# Patient Record
Sex: MALE | Race: WHITE | Employment: FULL TIME | URBAN - METROPOLITAN AREA
[De-identification: names, ages, dates, MRNs, and addresses within clinical notes are randomized per-mention and may not be internally consistent; named-entity substitution may affect disease eponyms.]

---

## 2020-09-12 RX ORDER — OMEPRAZOLE 20 MG/1
CAPSULE, DELAYED RELEASE ORAL
Qty: 90 CAP | Refills: 1 | Status: SHIPPED | OUTPATIENT
Start: 2020-09-12 | End: 2021-03-15 | Stop reason: SDUPTHER

## 2020-09-30 ENCOUNTER — OFFICE VISIT (OUTPATIENT)
Dept: PRIMARY CARE CLINIC | Age: 45
End: 2020-09-30
Payer: COMMERCIAL

## 2020-09-30 ENCOUNTER — HOSPITAL ENCOUNTER (OUTPATIENT)
Dept: GENERAL RADIOLOGY | Age: 45
Discharge: HOME | End: 2020-09-30
Payer: COMMERCIAL

## 2020-09-30 VITALS
BODY MASS INDEX: 34.21 KG/M2 | RESPIRATION RATE: 18 BRPM | HEART RATE: 75 BPM | OXYGEN SATURATION: 97 % | TEMPERATURE: 97.7 F | HEIGHT: 67 IN | SYSTOLIC BLOOD PRESSURE: 126 MMHG | WEIGHT: 218 LBS | DIASTOLIC BLOOD PRESSURE: 73 MMHG

## 2020-09-30 DIAGNOSIS — K59.00 CONSTIPATION, UNSPECIFIED CONSTIPATION TYPE: Primary | ICD-10-CM

## 2020-09-30 DIAGNOSIS — R33.9 INCOMPLETE BLADDER EMPTYING: ICD-10-CM

## 2020-09-30 DIAGNOSIS — K59.00 CONSTIPATION, UNSPECIFIED CONSTIPATION TYPE: ICD-10-CM

## 2020-09-30 PROCEDURE — 99214 OFFICE O/P EST MOD 30 MIN: CPT | Performed by: NURSE PRACTITIONER

## 2020-09-30 PROCEDURE — 74018 RADEX ABDOMEN 1 VIEW: CPT

## 2020-09-30 PROCEDURE — 81003 URINALYSIS AUTO W/O SCOPE: CPT | Performed by: NURSE PRACTITIONER

## 2020-09-30 RX ORDER — POLYETHYLENE GLYCOL 3350 17 G/17G
17 POWDER, FOR SOLUTION ORAL DAILY
Qty: 30 EACH | Refills: 1 | Status: SHIPPED | OUTPATIENT
Start: 2020-09-30 | End: 2021-04-20 | Stop reason: ALTCHOICE

## 2020-10-02 LAB
BILIRUB UR QL STRIP: NEGATIVE
GLUCOSE UR-MCNC: NEGATIVE MG/DL
KETONES P FAST UR STRIP-MCNC: NEGATIVE MG/DL
PH UR STRIP: 5.5 [PH] (ref 4.6–8)
PROT UR QL STRIP: NEGATIVE
SP GR UR STRIP: 1 (ref 1–1.03)
UA UROBILINOGEN AMB POC: NORMAL (ref 0.2–1)
URINALYSIS CLARITY POC: CLEAR
URINALYSIS COLOR POC: YELLOW
URINE BLOOD POC: NEGATIVE
URINE LEUKOCYTES POC: NEGATIVE
URINE NITRITES POC: NEGATIVE

## 2020-10-03 LAB
PSA SERPL-MCNC: 1.1 NG/ML (ref 0–4)
REFLEX CRITERIA: NORMAL

## 2020-10-04 NOTE — PROGRESS NOTES
Spoke with patient in office and discussed small to moderate stool on x-ray. Also showed degnerative changes in lumbar as incidental finding. He reports improvement since treatment for acute constipation and he will follow up in 2 weeks to re-evaluate if miralax effective.

## 2020-10-04 NOTE — PROGRESS NOTES
Urine test and PSA are normal. Will refer to urologist for further eval of urinary concerns. Any questions please let me know.

## 2020-10-05 ENCOUNTER — TELEPHONE (OUTPATIENT)
Dept: PRIMARY CARE CLINIC | Age: 45
End: 2020-10-05

## 2020-10-05 NOTE — TELEPHONE ENCOUNTER
Pt called back and was given the results. He is declining to see a urologist for now       ----- Message from Cortney Jama LPN sent at [id number]/6721  9:03 AM EDT -----  Called pt no answer had to LVM to call the office      ----- Message -----  From: Philip Alston NP  Sent: 10/4/2020   5:51 PM EDT  To: Cortney Jama LPN    Urine test and PSA are normal. Will refer to urologist for further eval of urinary concerns. Any questions please let me know.

## 2020-10-14 ENCOUNTER — OFFICE VISIT (OUTPATIENT)
Dept: PRIMARY CARE CLINIC | Age: 45
End: 2020-10-14
Payer: COMMERCIAL

## 2020-10-14 VITALS
OXYGEN SATURATION: 98 % | RESPIRATION RATE: 18 BRPM | SYSTOLIC BLOOD PRESSURE: 109 MMHG | DIASTOLIC BLOOD PRESSURE: 70 MMHG | HEART RATE: 91 BPM | TEMPERATURE: 96.9 F

## 2020-10-14 DIAGNOSIS — R19.5 CHANGE IN STOOL CALIBER: ICD-10-CM

## 2020-10-14 DIAGNOSIS — R33.9 INCOMPLETE BLADDER EMPTYING: ICD-10-CM

## 2020-10-14 DIAGNOSIS — K59.00 CONSTIPATION, UNSPECIFIED CONSTIPATION TYPE: Primary | ICD-10-CM

## 2020-10-14 PROCEDURE — 99213 OFFICE O/P EST LOW 20 MIN: CPT | Performed by: NURSE PRACTITIONER

## 2020-10-16 NOTE — PROGRESS NOTES
Mario Ragland is a 39 y.o. male who presents to the office today for the following:    Chief Complaint   Patient presents with    Constipation       Past Medical History:   Diagnosis Date    Anxiety     Depression     GERD (gastroesophageal reflux disease)     Psychotic disorder ([facility] [address]        Past Surgical History:   Procedure Laterality Date    HX APPENDECTOMY          Family History   Problem Relation Age of Onset    No Known Problems Mother     No Known Problems Father         Social History     Tobacco Use    Smoking status: Current Every Day Smoker    Smokeless tobacco: Never Used   Substance Use Topics    Alcohol use: Not on file    Drug use: Not on file        HPI  Patient here for follow up from visit on 9/30/20 with complaint of constipation, stomach cramping and change in stool which has been worsening for about 6 months. States that he did initially feel better after clearing out with the mag citrate and starting the miralax but now feels symptoms are back. Is experiencing intermittent stomach cramping as well as he still feels stool is an odd shape. Reports that his stools almost look flattend on one side. No blood however noted. Does feel they are now softer w/ no straining as well as more regular since starting the miralax. Appetite and activity level ok. Current Outpatient Medications on File Prior to Visit   Medication Sig    polyethylene glycol (MIRALAX) 17 gram packet Take 1 Packet by mouth daily.  omeprazole (PRILOSEC) 20 mg capsule TAKE 1 CAPSULE BY MOUTH EVERY DAY     No current facility-administered medications on file prior to visit. No orders of the defined types were placed in this encounter. Review of Systems   Constitutional: Negative. Respiratory: Negative. Cardiovascular: Negative. Gastrointestinal: Positive for abdominal pain and constipation. Negative for diarrhea, heartburn, melena, nausea and vomiting.    Genitourinary: Positive for frequency. Negative for dysuria, hematuria and urgency. Musculoskeletal: Negative. Neurological: Negative. Visit Vitals  /70 (BP 1 Location: Left arm, BP Patient Position: Sitting)   Pulse 91   Temp 96.9 °F (36.1 °C) (Tympanic)   Resp 18   SpO2 98%       Physical Exam  Vitals signs and nursing note reviewed. Constitutional:       Appearance: Normal appearance. Cardiovascular:      Rate and Rhythm: Normal rate and regular rhythm. Pulses: Normal pulses. Heart sounds: Normal heart sounds. Pulmonary:      Effort: Pulmonary effort is normal.      Breath sounds: Normal breath sounds. Abdominal:      General: Bowel sounds are normal. There is no distension. Palpations: Abdomen is soft. There is no mass. Tenderness: There is no abdominal tenderness. There is no guarding or rebound. Hernia: No hernia is present. Musculoskeletal: Normal range of motion. Skin:     General: Skin is warm and dry. Neurological:      Mental Status: He is alert. Mental status is at baseline. 1. Constipation, unspecified constipation type  He will continue miralax as directed since he feels this has helped with his constipation  Is still experiencing intermittent cramping and feels stool is changed in shape  No significant abdominal pain and does report history of problems with constipation most of adult life but really worsened in past 6 months  Seems potential that he has IBS as he does not have the cramping every day and stools seem regular/soft now that he started the miralax  Given however he is reporting change in shape of stool and sx of persistent intermittent cramping, will refer to GI for further eval and treatment  - REFERRAL TO GASTROENTEROLOGY    2. Change in stool caliber  See above    3.  Incomplete bladder emptying  PSA was 1.1 and UA clear  We discussed referral to urology but sx are intermittent and feels it could be more associated with acute constipation episodes.       Patient verbalizes understanding of plan of care as discussed above

## 2020-11-11 ENCOUNTER — OFFICE VISIT (OUTPATIENT)
Dept: GASTROENTEROLOGY | Age: 45
End: 2020-11-11
Payer: COMMERCIAL

## 2020-11-11 ENCOUNTER — TELEPHONE (OUTPATIENT)
Dept: GASTROENTEROLOGY | Age: 45
End: 2020-11-11

## 2020-11-11 VITALS
HEART RATE: 67 BPM | HEIGHT: 67 IN | OXYGEN SATURATION: 96 % | DIASTOLIC BLOOD PRESSURE: 80 MMHG | TEMPERATURE: 97.6 F | SYSTOLIC BLOOD PRESSURE: 120 MMHG | WEIGHT: 213 LBS | RESPIRATION RATE: 16 BRPM | BODY MASS INDEX: 33.43 KG/M2

## 2020-11-11 DIAGNOSIS — R19.4 CHANGE IN BOWEL HABITS: Primary | ICD-10-CM

## 2020-11-11 DIAGNOSIS — K59.00 CONSTIPATION, UNSPECIFIED CONSTIPATION TYPE: ICD-10-CM

## 2020-11-11 DIAGNOSIS — R10.30 LOWER ABDOMINAL PAIN: ICD-10-CM

## 2020-11-11 PROBLEM — F41.9 ANXIETY: Status: ACTIVE | Noted: 2020-11-11

## 2020-11-11 PROBLEM — K21.9 GERD (GASTROESOPHAGEAL REFLUX DISEASE): Status: ACTIVE | Noted: 2020-11-11

## 2020-11-11 PROBLEM — F32.A DEPRESSION: Status: ACTIVE | Noted: 2020-11-11

## 2020-11-11 PROCEDURE — 99204 OFFICE O/P NEW MOD 45 MIN: CPT | Performed by: INTERNAL MEDICINE

## 2020-11-11 RX ORDER — POLYETHYLENE GLYCOL 3350 17 G/17G
POWDER, FOR SOLUTION ORAL
Qty: 510 G | Refills: 0 | Status: SHIPPED | OUTPATIENT
Start: 2020-11-11 | End: 2020-11-20

## 2020-11-11 NOTE — PROGRESS NOTES
1. Have you been to the ER, urgent care clinic since your last visit? Hospitalized since your last visit? no    2. Have you seen or consulted any other health care providers outside of the [address] [place] [address] since your last visit? Include any pap smears or colon screening.  no.

## 2020-11-11 NOTE — H&P (VIEW-ONLY)
Levy Stanford is a 39 y.o. male who presents today for the following: Chief Complaint Patient presents with  Constipation  
  referred by Dena Rivers NP No Known Allergies Current Outpatient Medications Medication Sig  polyethylene glycol (MIRALAX) 17 gram/dose powder Use as directed  Indications: emptying of the bowel  polyethylene glycol (MIRALAX) 17 gram packet Take 1 Packet by mouth daily.  omeprazole (PRILOSEC) 20 mg capsule TAKE 1 CAPSULE BY MOUTH EVERY DAY No current facility-administered medications for this visit. Past Medical History:  
Diagnosis Date  Anxiety  Anxiety 11/11/2020  Depression  Depression 11/11/2020  GERD (gastroesophageal reflux disease)  GERD (gastroesophageal reflux disease) 11/11/2020  Psychotic disorder ([facility] [address] Past Surgical History:  
Procedure Laterality Date  HX APPENDECTOMY Family History Problem Relation Age of Onset  No Known Problems Mother  No Known Problems Father Social History Socioeconomic History  Marital status:  Spouse name: Not on file  Number of children: Not on file  Years of education: Not on file  Highest education level: Not on file Occupational History  Not on file Social Needs  Financial resource strain: Not on file  Food insecurity Worry: Not on file Inability: Not on file  Transportation needs Medical: Not on file Non-medical: Not on file Tobacco Use  Smoking status: Current Every Day Smoker  Smokeless tobacco: Never Used Substance and Sexual Activity  Alcohol use: Not on file  Drug use: Not on file  Sexual activity: Not on file Lifestyle  Physical activity Days per week: Not on file Minutes per session: Not on file  Stress: Not on file Relationships  Social connections Talks on phone: Not on file Gets together: Not on file   Attends Spiritism service: Not on file Active member of club or organization: Not on file Attends meetings of clubs or organizations: Not on file Relationship status: Not on file  Intimate partner violence Fear of current or ex partner: Not on file Emotionally abused: Not on file Physically abused: Not on file Forced sexual activity: Not on file Other Topics Concern  Not on file Social History Narrative  Not on file HPI 
61-year-old male with history of GERD was referred for evaluation of a change in bowel habits. States a couple months ago he was seen for evaluation by his PCP with constipation and crampy abdominal pain been given worse over the prior 6 months. Was given a dose of magnesium citrate and then started on MiraLAX patient states the constipation improved however he continues to have crampy abdominal pain and the shape of his stools have changed. He states his stool come out smaller and thin now which is new. Patient states the stools appear flattened on one side. States after a bowel movement he may pass a small amount of stool he has a lot of crampy pain. Sensation of having to defecate and when he attempts a bowel movement passed no stool. He states his stools are solid and soft. Some blood in his stool but has seen blood on wiping in the past.  He has a good appetite and eating well. States he has history of indigestion and was treated with help. Patient states he is recently increased in weight after starting treatment for the GERD. Patient states his grandmother had colon cancer. Review of Systems Constitutional: Negative. HENT: Negative. Negative for nosebleeds. Eyes: Negative. Respiratory: Negative. Cardiovascular: Negative. Gastrointestinal: Positive for abdominal pain, blood in stool, constipation and heartburn. Negative for diarrhea, melena, nausea and vomiting. Genitourinary: Negative. Musculoskeletal: Negative. Skin: Negative. Neurological: Negative. Endo/Heme/Allergies: Negative. Psychiatric/Behavioral: Negative. All other systems reviewed and are negative. Visit Vitals /80 (BP 1 Location: Left arm, BP Patient Position: Sitting) Pulse 67 Temp 97.6 °F (36.4 °C) (Skin) Resp 16 Ht 5' 7\" (1.702 m) Wt 96.6 kg (213 lb) SpO2 96% Comment: room air BMI 33.36 kg/m² Physical Exam 
Vitals signs and nursing note reviewed. Constitutional:   
   Appearance: Normal appearance. He is obese. HENT:  
   Head: Normocephalic and atraumatic. Nose: Nose normal.  
   Mouth/Throat:  
   Mouth: Mucous membranes are moist.  
   Pharynx: Oropharynx is clear. Eyes:  
   General: No scleral icterus. Extraocular Movements: Extraocular movements intact. Conjunctiva/sclera: Conjunctivae normal.  
   Pupils: Pupils are equal, round, and reactive to light. Neck: Musculoskeletal: Normal range of motion and neck supple. Cardiovascular:  
   Rate and Rhythm: Normal rate and regular rhythm. Heart sounds: Normal heart sounds. Pulmonary:  
   Effort: Pulmonary effort is normal.  
   Breath sounds: Normal breath sounds. Abdominal:  
   General: Bowel sounds are normal. There is no distension. Palpations: Abdomen is soft. There is no mass. Tenderness: There is abdominal tenderness. There is guarding. There is no right CVA tenderness, left CVA tenderness or rebound. Hernia: No hernia is present. Musculoskeletal: Normal range of motion. Skin: 
   General: Skin is warm and dry. Coloration: Skin is not jaundiced. Neurological:  
   General: No focal deficit present. Mental Status: He is alert and oriented to person, place, and time. Psychiatric:     
   Mood and Affect: Mood normal.     
   Behavior: Behavior normal.     
   Thought Content: Thought content normal.     
   Judgment: Judgment normal.  
 
  
 
 
1. Change in bowel habits Uncertain of cause.   Patient's shape of his stool suggests narrowing in his colon at some point which is change in the shape of the stool. This could be secondary to diverticular disease with swollen and decreased intraluminal size versus some other lesions causing the same. 
- COLONOSCOPY,DIAGNOSTIC; Future - polyethylene glycol (MIRALAX) 17 gram/dose powder; Use as directed  Indications: emptying of the bowel  Dispense: 510 g; Refill: 0 
 
2. Constipation, unspecified constipation type Improve with the l use of the MiraLAX 3. Lower abdominal pain

## 2020-11-11 NOTE — PROGRESS NOTES
COLONOSCOPY IS SCHEDULED FOR 11- AT 8:30 AM.  NO PA REQUIRED FOR COLONOSCOPY PER [facility]-REF # [id number] 11- AT 5:43 PM

## 2020-11-11 NOTE — PROGRESS NOTES
Fernando Castro is a 39 y.o. male who presents today for the following:  Chief Complaint   Patient presents with    Constipation     referred by Tim Vega NP         No Known Allergies    Current Outpatient Medications   Medication Sig    polyethylene glycol (MIRALAX) 17 gram/dose powder Use as directed  Indications: emptying of the bowel    polyethylene glycol (MIRALAX) 17 gram packet Take 1 Packet by mouth daily.  omeprazole (PRILOSEC) 20 mg capsule TAKE 1 CAPSULE BY MOUTH EVERY DAY     No current facility-administered medications for this visit.         Past Medical History:   Diagnosis Date    Anxiety     Anxiety 11/11/2020    Depression     Depression 11/11/2020    GERD (gastroesophageal reflux disease)     GERD (gastroesophageal reflux disease) 11/11/2020    Psychotic disorder ([facility] [address]        Past Surgical History:   Procedure Laterality Date    HX APPENDECTOMY         Family History   Problem Relation Age of Onset    No Known Problems Mother     No Known Problems Father        Social History     Socioeconomic History    Marital status:      Spouse name: Not on file    Number of children: Not on file    Years of education: Not on file    Highest education level: Not on file   Occupational History    Not on file   Social Needs    Financial resource strain: Not on file    Food insecurity     Worry: Not on file     Inability: Not on file    Transportation needs     Medical: Not on file     Non-medical: Not on file   Tobacco Use    Smoking status: Current Every Day Smoker    Smokeless tobacco: Never Used   Substance and Sexual Activity    Alcohol use: Not on file    Drug use: Not on file    Sexual activity: Not on file   Lifestyle    Physical activity     Days per week: Not on file     Minutes per session: Not on file    Stress: Not on file   Relationships    Social connections     Talks on phone: Not on file     Gets together: Not on file     Attends Adventism service: Not on file     Active member of club or organization: Not on file     Attends meetings of clubs or organizations: Not on file     Relationship status: Not on file    Intimate partner violence     Fear of current or ex partner: Not on file     Emotionally abused: Not on file     Physically abused: Not on file     Forced sexual activity: Not on file   Other Topics Concern    Not on file   Social History Narrative    Not on file         HPI  44-year-old male with history of GERD was referred for evaluation of a change in bowel habits. States a couple months ago he was seen for evaluation by his PCP with constipation and crampy abdominal pain been given worse over the prior 6 months. Was given a dose of magnesium citrate and then started on MiraLAX patient states the constipation improved however he continues to have crampy abdominal pain and the shape of his stools have changed. He states his stool come out smaller and thin now which is new. Patient states the stools appear flattened on one side. States after a bowel movement he may pass a small amount of stool he has a lot of crampy pain. Sensation of having to defecate and when he attempts a bowel movement passed no stool. He states his stools are solid and soft. Some blood in his stool but has seen blood on wiping in the past.  He has a good appetite and eating well. States he has history of indigestion and was treated with help. Patient states he is recently increased in weight after starting treatment for the GERD. Patient states his grandmother had colon cancer. Review of Systems   Constitutional: Negative. HENT: Negative. Negative for nosebleeds. Eyes: Negative. Respiratory: Negative. Cardiovascular: Negative. Gastrointestinal: Positive for abdominal pain, blood in stool, constipation and heartburn. Negative for diarrhea, melena, nausea and vomiting. Genitourinary: Negative. Musculoskeletal: Negative. Skin: Negative. Neurological: Negative. Endo/Heme/Allergies: Negative. Psychiatric/Behavioral: Negative. All other systems reviewed and are negative. Visit Vitals  /80 (BP 1 Location: Left arm, BP Patient Position: Sitting)   Pulse 67   Temp 97.6 °F (36.4 °C) (Skin)   Resp 16   Ht 5' 7\" (1.702 m)   Wt 96.6 kg (213 lb)   SpO2 96% Comment: room air   BMI 33.36 kg/m²     Physical Exam  Vitals signs and nursing note reviewed. Constitutional:       Appearance: Normal appearance. He is obese. HENT:      Head: Normocephalic and atraumatic. Nose: Nose normal.      Mouth/Throat:      Mouth: Mucous membranes are moist.      Pharynx: Oropharynx is clear. Eyes:      General: No scleral icterus. Extraocular Movements: Extraocular movements intact. Conjunctiva/sclera: Conjunctivae normal.      Pupils: Pupils are equal, round, and reactive to light. Neck:      Musculoskeletal: Normal range of motion and neck supple. Cardiovascular:      Rate and Rhythm: Normal rate and regular rhythm. Heart sounds: Normal heart sounds. Pulmonary:      Effort: Pulmonary effort is normal.      Breath sounds: Normal breath sounds. Abdominal:      General: Bowel sounds are normal. There is no distension. Palpations: Abdomen is soft. There is no mass. Tenderness: There is abdominal tenderness. There is guarding. There is no right CVA tenderness, left CVA tenderness or rebound. Hernia: No hernia is present. Musculoskeletal: Normal range of motion. Skin:     General: Skin is warm and dry. Coloration: Skin is not jaundiced. Neurological:      General: No focal deficit present. Mental Status: He is alert and oriented to person, place, and time. Psychiatric:         Mood and Affect: Mood normal.         Behavior: Behavior normal.         Thought Content: Thought content normal.         Judgment: Judgment normal.            1. Change in bowel habits  Uncertain of cause.   Patient's shape of his stool suggests narrowing in his colon at some point which is change in the shape of the stool. This could be secondary to diverticular disease with swollen and decreased intraluminal size versus some other lesions causing the same.  - COLONOSCOPY,DIAGNOSTIC; Future  - polyethylene glycol (MIRALAX) 17 gram/dose powder; Use as directed  Indications: emptying of the bowel  Dispense: 510 g; Refill: 0    2. Constipation, unspecified constipation type  Improve with the l use of the MiraLAX    3.  Lower abdominal pain

## 2020-11-13 ENCOUNTER — HOSPITAL ENCOUNTER (OUTPATIENT)
Dept: PREADMISSION TESTING | Age: 45
Discharge: HOME | End: 2020-11-13
Payer: COMMERCIAL

## 2020-11-13 LAB — SARS-COV-2, COV2: NORMAL

## 2020-11-13 PROCEDURE — 87635 SARS-COV-2 COVID-19 AMP PRB: CPT

## 2020-11-16 LAB — SARS-COV-2, COV2NT: NOT DETECTED

## 2020-11-20 ENCOUNTER — HOSPITAL ENCOUNTER (OUTPATIENT)
Age: 45
Setting detail: OUTPATIENT SURGERY
Discharge: HOME | End: 2020-11-20
Attending: INTERNAL MEDICINE | Admitting: INTERNAL MEDICINE
Payer: COMMERCIAL

## 2020-11-20 VITALS
WEIGHT: 213 LBS | HEART RATE: 77 BPM | HEIGHT: 71 IN | DIASTOLIC BLOOD PRESSURE: 85 MMHG | BODY MASS INDEX: 29.82 KG/M2 | SYSTOLIC BLOOD PRESSURE: 110 MMHG | OXYGEN SATURATION: 95 % | TEMPERATURE: 97.9 F | RESPIRATION RATE: 18 BRPM

## 2020-11-20 PROCEDURE — 88305 TISSUE EXAM BY PATHOLOGIST: CPT

## 2020-11-20 PROCEDURE — 76060000032 HC ANESTHESIA 0.5 TO 1 HR: Performed by: INTERNAL MEDICINE

## 2020-11-20 PROCEDURE — 77030019988 HC FCPS ENDOSC DISP BSC -B: Performed by: INTERNAL MEDICINE

## 2020-11-20 PROCEDURE — 76040000007: Performed by: INTERNAL MEDICINE

## 2020-11-20 PROCEDURE — 74011250636 HC RX REV CODE- 250/636: Performed by: INTERNAL MEDICINE

## 2020-11-20 PROCEDURE — 45385 COLONOSCOPY W/LESION REMOVAL: CPT | Performed by: INTERNAL MEDICINE

## 2020-11-20 PROCEDURE — 2709999900 HC NON-CHARGEABLE SUPPLY: Performed by: INTERNAL MEDICINE

## 2020-11-20 RX ORDER — MIDAZOLAM HYDROCHLORIDE 1 MG/ML
INJECTION, SOLUTION INTRAMUSCULAR; INTRAVENOUS
Status: DISCONTINUED
Start: 2020-11-20 | End: 2020-11-20 | Stop reason: WASHOUT

## 2020-11-20 RX ORDER — FENTANYL CITRATE 50 UG/ML
INJECTION, SOLUTION INTRAMUSCULAR; INTRAVENOUS AS NEEDED
Status: DISCONTINUED | OUTPATIENT
Start: 2020-11-20 | End: 2020-11-20 | Stop reason: HOSPADM

## 2020-11-20 RX ORDER — MIDAZOLAM HYDROCHLORIDE 1 MG/ML
INJECTION, SOLUTION INTRAMUSCULAR; INTRAVENOUS AS NEEDED
Status: DISCONTINUED | OUTPATIENT
Start: 2020-11-20 | End: 2020-11-20 | Stop reason: HOSPADM

## 2020-11-20 RX ORDER — SODIUM CHLORIDE 0.9 % (FLUSH) 0.9 %
5-40 SYRINGE (ML) INJECTION AS NEEDED
Status: DISCONTINUED | OUTPATIENT
Start: 2020-11-20 | End: 2020-11-20 | Stop reason: HOSPADM

## 2020-11-20 RX ORDER — SODIUM CHLORIDE 9 MG/ML
125 INJECTION, SOLUTION INTRAVENOUS CONTINUOUS
Status: DISCONTINUED | OUTPATIENT
Start: 2020-11-20 | End: 2020-11-20 | Stop reason: HOSPADM

## 2020-11-20 RX ORDER — SODIUM CHLORIDE 0.9 % (FLUSH) 0.9 %
5-40 SYRINGE (ML) INJECTION EVERY 8 HOURS
Status: DISCONTINUED | OUTPATIENT
Start: 2020-11-20 | End: 2020-11-20 | Stop reason: HOSPADM

## 2020-11-20 RX ORDER — FENTANYL CITRATE 50 UG/ML
INJECTION, SOLUTION INTRAMUSCULAR; INTRAVENOUS
Status: DISCONTINUED
Start: 2020-11-20 | End: 2020-11-20 | Stop reason: WASHOUT

## 2020-11-20 RX ORDER — FENTANYL CITRATE 50 UG/ML
INJECTION, SOLUTION INTRAMUSCULAR; INTRAVENOUS
Status: DISCONTINUED
Start: 2020-11-20 | End: 2020-11-20 | Stop reason: HOSPADM

## 2020-11-20 RX ORDER — MIDAZOLAM HYDROCHLORIDE 1 MG/ML
INJECTION, SOLUTION INTRAMUSCULAR; INTRAVENOUS
Status: DISCONTINUED
Start: 2020-11-20 | End: 2020-11-20 | Stop reason: HOSPADM

## 2020-11-20 RX ADMIN — SODIUM CHLORIDE 125 ML/HR: 9 INJECTION, SOLUTION INTRAVENOUS at 07:55

## 2020-11-20 NOTE — DISCHARGE INSTRUCTIONS

## 2020-11-20 NOTE — OP NOTES
Colonoscopy Procedure Note      Patient: Filiberto Duran MRN: [id number]  SSN: [id number]    YOB: 1975  Age: 39 y.o. Sex: male      Date of Procedure: 11/20/2020  Date/Time:  11/20/2020 9:51 AM       IMPRESSION:     1. Rectal polyps         RECOMMENDATIONS:     1) Check biopsy results. 2) Await pathology report. Call me in 2 weeks if you have not received any information from my office regarding your results. 3) Patient should have repeat colonoscopy in 2 to 3 years or as determined by the pathology report. INDICATION: Change in bowel habits   PROCEDURE PERFORMED: Colonoscopy with snare polypectomy   DESCRIPTION OF PROCEDURE: An informed consent was obtained. The patient was placed in left lateral position. Perianal inspection and a digital rectal exam was performed. Video colonoscope was introduced into the rectum and advanced under direct vision up to the terminal ileum. With adequate insufflation and maneuvering of the withdrawing scope, the colonic mucosa was visualized carefully. Retroflexion was performed in the rectum to see the anorectum and also in the ascending colon to look behind the folds. Vital signs, pulse oximetry, single lead cardiac monitor were monitored throughout the procedure as the sedation was titrated to the desired effect ensuring patient comfort and safety. The patient tolerated the procedure very well and was transferred to the recovery area. Following is the summary of findings: In the proximal rectum is sore a polyp measuring proxy 0.5 cm was removed via cold biopsy. 1.5 cm polyp was noted in the mid rectum which was removed via snare polypectomy. The polyp was pedunculated and was completely removed.       ENDOSCOPIST: Don Rodarte MD   ENDOSCOPE: Olympus video colonoscope   ASSISTANT:Circ-1: David Wilson               Circ-2: Gregg Segura              Scrub Tech-1: Any Luis Staff: Jerry Hernandez RN   ANESTHESIA: Moderate sedation with fentanyl 100 mcg IV and Versed 4 mg IV      QUALITY OF PREPARATION:fair      FINDINGS:   1.  Rectal polyps        EBL: Minimal   SPECIMENS:   ID Type Source Tests Collected by Time Destination   1 : rectal polyp Preservative Rectum  Sagar Cristobal MD 11/20/2020 [id number] Pathology             Radha West MD  November 20, 2020  9:51 AM

## 2020-11-20 NOTE — INTERVAL H&P NOTE
Update History & Physical 
 
The Patient's History and Physical of November 20, 2020  
 was reviewed with the patient and I examined the patient. There was no change. The surgical site was confirmed by the patient and me. Plan:  The risk, benefits, expected outcome, and alternative to the recommended procedure have been discussed with the patient. Patient understands and wants to proceed with the procedure.  
 
Electronically signed by Lexi Gannon MD on 11/20/2020 at 8:54 AM

## 2020-11-30 NOTE — PROGRESS NOTES
Tell patient that the polyp removed from his rectum was benign. He should have a repeat colonoscopy in 2 years.

## 2020-12-03 ENCOUNTER — TELEPHONE (OUTPATIENT)
Dept: GASTROENTEROLOGY | Age: 45
End: 2020-12-03

## 2020-12-03 NOTE — TELEPHONE ENCOUNTER
----- Message from Elias Contreras MD sent at 11/30/2020 11:53 AM EST -----  Tell patient that the polyp removed from his rectum was benign. He should have a repeat colonoscopy in 2 years.

## 2020-12-03 NOTE — TELEPHONE ENCOUNTER
Rashaad Cantu returned my call, I explained during his colonoscopy Dr Arely Nguyen removed on rectal which was completely benign. Will follow up and repeat colonoscopy in 2 years. He said ok.

## 2020-12-03 NOTE — TELEPHONE ENCOUNTER
----- Message from Yesi Osborn MD sent at 11/30/2020 11:53 AM EST -----  Tell patient that the polyp removed from his rectum was benign. He should have a repeat colonoscopy in 2 years.

## 2021-03-15 ENCOUNTER — TELEPHONE (OUTPATIENT)
Dept: PRIMARY CARE CLINIC | Age: 46
End: 2021-03-15

## 2021-03-15 RX ORDER — OMEPRAZOLE 20 MG/1
20 CAPSULE, DELAYED RELEASE ORAL DAILY
Qty: 90 CAP | Refills: 1 | Status: SHIPPED | OUTPATIENT
Start: 2021-03-15 | End: 2021-09-07

## 2021-04-15 ENCOUNTER — APPOINTMENT (OUTPATIENT)
Dept: GENERAL RADIOLOGY | Age: 46
End: 2021-04-15
Attending: EMERGENCY MEDICINE
Payer: COMMERCIAL

## 2021-04-15 ENCOUNTER — HOSPITAL ENCOUNTER (EMERGENCY)
Age: 46
Discharge: HOME | End: 2021-04-15
Attending: EMERGENCY MEDICINE
Payer: COMMERCIAL

## 2021-04-15 VITALS
SYSTOLIC BLOOD PRESSURE: 130 MMHG | BODY MASS INDEX: 29.01 KG/M2 | HEIGHT: 71 IN | TEMPERATURE: 98.4 F | WEIGHT: 207.23 LBS | OXYGEN SATURATION: 96 % | RESPIRATION RATE: 18 BRPM | HEART RATE: 103 BPM | DIASTOLIC BLOOD PRESSURE: 87 MMHG

## 2021-04-15 DIAGNOSIS — S80.10XA CONTUSION OF LOWER LEG, UNSPECIFIED LATERALITY, INITIAL ENCOUNTER: Primary | ICD-10-CM

## 2021-04-15 PROCEDURE — 74011250636 HC RX REV CODE- 250/636: Performed by: EMERGENCY MEDICINE

## 2021-04-15 PROCEDURE — 73590 X-RAY EXAM OF LOWER LEG: CPT

## 2021-04-15 PROCEDURE — 99283 EMERGENCY DEPT VISIT LOW MDM: CPT

## 2021-04-15 PROCEDURE — 96372 THER/PROPH/DIAG INJ SC/IM: CPT

## 2021-04-15 RX ORDER — OMEPRAZOLE 20 MG/1
20 TABLET, DELAYED RELEASE ORAL DAILY
COMMUNITY
End: 2021-04-20 | Stop reason: SDUPTHER

## 2021-04-15 RX ORDER — KETOROLAC TROMETHAMINE 30 MG/ML
60 INJECTION, SOLUTION INTRAMUSCULAR; INTRAVENOUS
Status: COMPLETED | OUTPATIENT
Start: 2021-04-15 | End: 2021-04-15

## 2021-04-15 RX ORDER — CLONIDINE HYDROCHLORIDE 0.1 MG/1
0.2 TABLET ORAL
Status: DISCONTINUED | OUTPATIENT
Start: 2021-04-15 | End: 2021-04-15

## 2021-04-15 RX ADMIN — KETOROLAC TROMETHAMINE 60 MG: 30 INJECTION, SOLUTION INTRAMUSCULAR at 06:37

## 2021-04-15 NOTE — ED PROVIDER NOTES
EMERGENCY DEPARTMENT HISTORY AND PHYSICAL EXAM      Date: 4/15/2021  Patient Name: Joe Gambino    History of Presenting Illness     Chief Complaint   Patient presents with    Leg Pain       History Provided By: Patient    HPI: Joe Gambino, 42 y.o. male with a past medical history significant GERD presents to the ED with cc of left lower leg pain. Patient works at [employer] lifting heavy objects not sure he hit his leg. No fevers of chills or obvious trauma. No hx of DVTs. No SOB or Chest Pain. There are no other complaints, changes, or physical findings at this time. PCP: Lana Cardona NP    No current facility-administered medications on file prior to encounter. Current Outpatient Medications on File Prior to Encounter   Medication Sig Dispense Refill    omeprazole (PRILOSEC OTC) 20 mg tablet Take 20 mg by mouth daily. Past History     Past Medical History:  Past Medical History:   Diagnosis Date    Gastrointestinal disorder        Past Surgical History:  Past Surgical History:   Procedure Laterality Date    HX APPENDECTOMY         Family History:  History reviewed. No pertinent family history. Social History:  Social History     Tobacco Use    Smoking status: Heavy Tobacco Smoker     Packs/day: 1.50    Smokeless tobacco: Never Used   Substance Use Topics    Alcohol use: Yes     Alcohol/week: 1.0 standard drinks     Types: 1 Glasses of wine per week     Comment: 1 tiime / week    Drug use: Never       Allergies:  No Known Allergies      Review of Systems     Review of Systems   Constitutional: Negative. HENT: Negative. Eyes: Negative. Respiratory: Negative. Cardiovascular: Negative. Gastrointestinal: Negative. Endocrine: Negative. Genitourinary: Negative. Musculoskeletal: Positive for arthralgias. Pain anterior left lower leg with  reddness   Skin: Negative. Allergic/Immunologic: Negative. Neurological: Negative. Hematological: Negative. Psychiatric/Behavioral: Negative. All other systems reviewed and are negative. Physical Exam     Physical Exam  Vitals signs and nursing note reviewed. Constitutional:       Appearance: Normal appearance. HENT:      Head: Normocephalic. Neck:      Musculoskeletal: Normal range of motion. Cardiovascular:      Rate and Rhythm: Normal rate. Pulmonary:      Effort: Pulmonary effort is normal.   Abdominal:      General: Abdomen is flat. Musculoskeletal: Normal range of motion. General: Tenderness present. Comments: Tenderness on the right side   Skin:     General: Skin is warm. Findings: Erythema present. Comments: Left lower leg tenderness   Neurological:      General: No focal deficit present. Mental Status: He is alert and oriented to person, place, and time. Psychiatric:         Mood and Affect: Mood normal.         Lab and Diagnostic Study Results     Labs -   No results found for this or any previous visit (from the past 12 hour(s)). Radiologic Studies -   @lastxrresult@  CT Results  (Last 48 hours)    None        CXR Results  (Last 48 hours)    None            Medical Decision Making   - I am the first provider for this patient. - I reviewed the vital signs, available nursing notes, past medical history, past surgical history, family history and social history. - Initial assessment performed. The patients presenting problems have been discussed, and they are in agreement with the care plan formulated and outlined with them. I have encouraged them to ask questions as they arise throughout their visit. Vital Signs-Reviewed the patient's vital signs.   Patient Vitals for the past 12 hrs:   Temp Pulse Resp BP SpO2   04/15/21 0634    130/87    04/15/21 0629 98.4 °F (36.9 °C) (!) 103 18 (!) 161/148 96 %       Records Reviewed: Nursing Notes    The patient presents with left lower leg anterior shin pain with fracture, contusion       ED Course:          Provider Notes (Medical Decision Making): MDM       Procedures   Medical Decision Makingedical Decision Making  Performed by: Rodell Schwab, MD  PROCEDURES:Procedures       Disposition   Disposition: DC- Adult Discharges: All of the diagnostic tests were reviewed and questions answered. Diagnosis, care plan and treatment options were discussed. The patient understands the instructions and will follow up as directed. The patients results have been reviewed with them. They have been counseled regarding their diagnosis. The patient verbally convey understanding and agreement of the signs, symptoms, diagnosis, treatment and prognosis and additionally agrees to follow up as recommended with their PCP in 24 - 48 hours. They also agree with the care-plan and convey that all of their questions have been answered. I have also put together some discharge instructions for them that include: 1) educational information regarding their diagnosis, 2) how to care for their diagnosis at home, as well a 3) list of reasons why they would want to return to the ED prior to their follow-up appointment, should their condition change. DISCHARGE PLAN:  1. Current Discharge Medication List      CONTINUE these medications which have NOT CHANGED    Details   omeprazole (PRILOSEC OTC) 20 mg tablet Take 20 mg by mouth daily. 2.   Follow-up Information    None       3. Return to ED if worse   4. Current Discharge Medication List            Diagnosis     Clinical Impression: No diagnosis found. Attestations:    Rodell Schwab, MD    Please note that this dictation was completed with [employer], the computer voice recognition software. Quite often unanticipated grammatical, syntax, homophones, and other interpretive errors are inadvertently transcribed by the computer software. Please disregard these errors. Please excuse any errors that have escaped final proofreading.   Thank you.

## 2021-04-15 NOTE — LETTER
Meg Benedict was seen and treated in our emergency department on 4/15/2021. He may return to work on 04/17/2021 If you have any questions or concerns, please don't hesitate to call.  
 
 
 
Dashawn Marrufo RN, MHA for Dr. Cem Edmondson

## 2021-04-20 ENCOUNTER — OFFICE VISIT (OUTPATIENT)
Dept: PRIMARY CARE CLINIC | Age: 46
End: 2021-04-20
Payer: COMMERCIAL

## 2021-04-20 VITALS
SYSTOLIC BLOOD PRESSURE: 120 MMHG | BODY MASS INDEX: 29.71 KG/M2 | OXYGEN SATURATION: 97 % | WEIGHT: 213 LBS | HEART RATE: 102 BPM | RESPIRATION RATE: 18 BRPM | DIASTOLIC BLOOD PRESSURE: 87 MMHG | TEMPERATURE: 97.2 F

## 2021-04-20 DIAGNOSIS — S80.12XA CONTUSION OF LEFT LOWER LEG, INITIAL ENCOUNTER: Primary | ICD-10-CM

## 2021-04-20 PROCEDURE — 99213 OFFICE O/P EST LOW 20 MIN: CPT | Performed by: NURSE PRACTITIONER

## 2021-04-20 NOTE — PROGRESS NOTES
Coy Snellen is a 42 y.o. male who presents to the office today for the following:    Chief Complaint   Patient presents with   [facility] ED Follow-up    Leg Pain       Past Medical History:   Diagnosis Date    Anxiety     Anxiety 11/11/2020    Depression     Depression 11/11/2020    Gastrointestinal disorder     GERD (gastroesophageal reflux disease)     GERD (gastroesophageal reflux disease) 11/11/2020    Psychotic disorder [facility])        Past Surgical History:   Procedure Laterality Date    COLONOSCOPY N/A 11/20/2020    [facility]COPY    HX APPENDECTOMY          Family History   Problem Relation Age of Onset    No Known Problems Mother     Lung Disease Father     Cancer Maternal Grandmother     Diabetes Maternal Grandmother     Heart Disease Maternal Grandmother         Social History     Tobacco Use    Smoking status: Heavy Tobacco Smoker     Packs/day: 1.50    Smokeless tobacco: Never Used    Tobacco comment: States I hae to get my head right. .. Substance Use Topics    Alcohol use: Yes     Alcohol/week: 1.0 standard drinks     Types: 1 Glasses of wine per week     Comment: 1 tiime / week    Drug use: Never        HPI  Patient here today with PMH of GERD and constipation who presents for follow up from ED with left lower leg pain. States that he works at [employer] and after coming home on 4/14/21 he noticed some bruising to lower leg and was having pain. Continued to have pain the next day so went to ED as a result. Denies any known injury but had been kneeling prolonged with boots on while working on equipment. Had x-ray done in ED and was told this was normal. Advised to use ace wrap and rest leg. Did feel it was getting better about 3 days ago but then started hurting again. Did not go to work yesterday and feels that it is doing better again today. Taking some ibuprofen occasionally.  Is able to walk on leg but hurts if trying to bend or squat.     Current Outpatient Medications on File Prior to Visit   Medication Sig    omeprazole (PRILOSEC) 20 mg capsule Take 1 Cap by mouth daily.  [DISCONTINUED] omeprazole (PRILOSEC OTC) 20 mg tablet Take 20 mg by mouth daily.  [DISCONTINUED] polyethylene glycol (MIRALAX) 17 gram packet Take 1 Packet by mouth daily. No current facility-administered medications on file prior to visit. No orders of the defined types were placed in this encounter. Review of Systems   Constitutional: Negative for chills, diaphoresis, fever, malaise/fatigue and weight loss. Musculoskeletal: Positive for joint pain (left lower leg) and myalgias. Negative for back pain, falls and neck pain. Skin: Negative for itching and rash. Neurological: Negative for tingling and sensory change. Visit Vitals  /87 (BP 1 Location: Left upper arm, BP Patient Position: Sitting, BP Cuff Size: Adult)   Pulse (!) 102   Temp 97.2 °F (36.2 °C) (Tympanic)   Resp 18   Wt 213 lb (96.6 kg)   SpO2 97%   BMI 29.71 kg/m²       Physical Exam  Vitals signs and nursing note reviewed. Constitutional:       Appearance: Normal appearance. Cardiovascular:      Pulses:           Dorsalis pedis pulses are 2+ on the right side and 2+ on the left side. Posterior tibial pulses are 2+ on the right side and 2+ on the left side. Musculoskeletal: Normal range of motion. General: Tenderness (over distal left tibia) present. Right knee: He exhibits normal range of motion and no swelling. Left knee: He exhibits normal range of motion. No tenderness found. Right ankle: He exhibits normal range of motion. No tenderness. Left ankle: He exhibits normal range of motion. No tenderness. Right lower leg: No edema. Left lower leg: No edema. Right foot: Normal range of motion. Left foot: Normal range of motion. Skin:     Capillary Refill: Capillary refill takes less than 2 seconds. Findings: Bruising (over left distal tibia) present. Neurological:      General: No focal deficit present. Mental Status: He is alert. 1. Contusion of left lower leg, initial encounter  XR Results (most recent):  Results from Hospital Encounter encounter on 04/15/21   XR TIB/FIB LT    Narrative History: pain    Technique:  XR TIB/FIB LT    COMPARISON: [None]  LIMITATIONS: [None]    BONES: [Normal]    JOINTS: [Normal]    SOFT TISSUES: [Normal]    OTHER: [None]        Impression No acute findings or diagnostic abnormality. Xray from ED reviewed and ok  Has local tenderness with bruising present. No calf pain or tenderness. Will continue ace wrap and recommend heat prn  May use ibuprofen with food prn  Range of motion exercises encouraged  Rest joint and will provide work note  Notify provider if symptoms not continuing to improve or if any worsening      Patient verbalizes understanding of plan of care as discussed above    Follow-up and Dispositions    · Return in about 1 week (around 4/27/2021) for or sooner for worsening symptoms.

## 2021-04-20 NOTE — PROGRESS NOTES
1. Have you been to the ER, urgent care clinic since your last visit? Hospitalized since your last visit?[facility]     2. Have you seen or consulted any other health care providers outside of the [address] [place] [address] since your last visit? Include any pap smears or colon screening.  No

## 2021-04-20 NOTE — LETTER
NOTIFICATION RETURN TO WORK / SCHOOL 
 
4/20/2021 9:29 AM 
 
Mr. Lobo Jasso [address] [address] To Whom It May Concern: 
 
Lobo Jasso is currently under the care of [facility]. Has not been able to attend work since 4/15/21. He will return to work/school on: 4/23/21 If there are questions or concerns please have the patient contact our office. Sincerely, Evangelina Heard NP

## 2021-05-19 ENCOUNTER — OFFICE VISIT (OUTPATIENT)
Dept: PRIMARY CARE CLINIC | Age: 46
End: 2021-05-19
Payer: COMMERCIAL

## 2021-05-19 VITALS
DIASTOLIC BLOOD PRESSURE: 64 MMHG | WEIGHT: 213 LBS | BODY MASS INDEX: 29.71 KG/M2 | HEART RATE: 88 BPM | RESPIRATION RATE: 18 BRPM | OXYGEN SATURATION: 96 % | TEMPERATURE: 98.3 F | SYSTOLIC BLOOD PRESSURE: 114 MMHG

## 2021-05-19 DIAGNOSIS — F17.200 TOBACCO DEPENDENCE: ICD-10-CM

## 2021-05-19 DIAGNOSIS — F41.9 ANXIETY: ICD-10-CM

## 2021-05-19 DIAGNOSIS — K21.9 GASTROESOPHAGEAL REFLUX DISEASE WITHOUT ESOPHAGITIS: ICD-10-CM

## 2021-05-19 DIAGNOSIS — R06.83 HABITUAL SNORING: ICD-10-CM

## 2021-05-19 DIAGNOSIS — E78.2 MIXED HYPERLIPIDEMIA: ICD-10-CM

## 2021-05-19 DIAGNOSIS — J44.9 CHRONIC OBSTRUCTIVE PULMONARY DISEASE, UNSPECIFIED COPD TYPE (HCC): Primary | ICD-10-CM

## 2021-05-19 DIAGNOSIS — R53.83 FATIGUE, UNSPECIFIED TYPE: ICD-10-CM

## 2021-05-19 PROCEDURE — 99214 OFFICE O/P EST MOD 30 MIN: CPT | Performed by: NURSE PRACTITIONER

## 2021-05-19 RX ORDER — BUDESONIDE AND FORMOTEROL FUMARATE DIHYDRATE 160; 4.5 UG/1; UG/1
2 AEROSOL RESPIRATORY (INHALATION) 2 TIMES DAILY
Qty: 1 INHALER | Refills: 5 | Status: SHIPPED | OUTPATIENT
Start: 2021-05-19 | End: 2021-10-15

## 2021-05-19 RX ORDER — ALBUTEROL SULFATE 90 UG/1
1 AEROSOL, METERED RESPIRATORY (INHALATION)
Qty: 1 INHALER | Refills: 5 | Status: SHIPPED | OUTPATIENT
Start: 2021-05-19

## 2021-05-19 NOTE — PROGRESS NOTES
Chief Complaint   Patient presents with    Leg Laceration     Follow up one month, pt would also like a referral for sleep study

## 2021-05-21 NOTE — PROGRESS NOTES
Lise Severs is a 42 y.o. male who presents to the office today for the following:    Chief Complaint   Patient presents with    Fatigue       Past Medical History:   Diagnosis Date    Anxiety     Anxiety 11/11/2020    Anxiety     Depression     Depression 11/11/2020    Gastrointestinal disorder     GERD (gastroesophageal reflux disease)     GERD (gastroesophageal reflux disease) 11/11/2020       Past Surgical History:   Procedure Laterality Date    COLONOSCOPY N/A 11/20/2020    [facility] by [facility] at [facility] ENDOSCOPY    HX APPENDECTOMY          Family History   Problem Relation Age of Onset    No Known Problems Mother     Lung Disease Father     Cancer Maternal Grandmother     Diabetes Maternal Grandmother     Heart Disease Maternal Grandmother         Social History     Tobacco Use    Smoking status: Heavy Tobacco Smoker     Packs/day: 1.50     Years: 29.00     Pack years: 43.50    Smokeless tobacco: Never Used    Tobacco comment: States I hae to get my head right. .. Vaping Use    Vaping Use: Never used   Substance Use Topics    Alcohol use: Yes     Alcohol/week: 1.0 standard drinks     Types: 1 Glasses of wine per week     Comment: 1 tiime / week    Drug use: Never        HPI  Patient here with PMH of anxiety, tobacco dependence and GERD who is here to discuss concerns with chronic snoring and fatigue. States that he does not sleep well and told by wife that he snores very loudly. Often does not feel rested when he wakes up in the morning. Wants to have sleep study as he has been told by others that he likely has sleep apnea. Also wants to discuss concerns with frequent cough and wheezing. He is a daily smoker for the past 29 years and knows that this is making his breathing worse. Frequently has symptoms when he is exerting himself. Has never been diagnosed with asthma or copd in past. Does get up sputum frequently but no blood noted.      Current Outpatient Medications on File Prior to Visit   Medication Sig    omeprazole (PRILOSEC) 20 mg capsule Take 1 Cap by mouth daily. No current facility-administered medications on file prior to visit. Medications Ordered Today   Medications    budesonide-formoteroL (SYMBICORT) 160-4.5 mcg/actuation HFAA     Sig: Take 2 Puffs by inhalation two (2) times a day. Dispense:  1 Inhaler     Refill:  5    albuterol (PROVENTIL HFA, VENTOLIN HFA, PROAIR HFA) 90 mcg/actuation inhaler     Sig: Take 1 Puff by inhalation every four (4) hours as needed for Wheezing. Dispense:  1 Inhaler     Refill:  5        Review of Systems   Constitutional: Negative. Respiratory: Positive for cough, sputum production, shortness of breath and wheezing. Negative for hemoptysis. Gastrointestinal: Negative. Genitourinary: Negative. Musculoskeletal: Negative. Neurological: Negative. Visit Vitals  /64 (BP 1 Location: Left upper arm, BP Patient Position: Sitting, BP Cuff Size: Adult)   Pulse 88   Temp 98.3 °F (36.8 °C)   Resp 18   Wt 213 lb (96.6 kg)   SpO2 96%   BMI 29.71 kg/m²       Physical Exam  Vitals and nursing note reviewed. Constitutional:       Appearance: Normal appearance. Eyes:      Pupils: Pupils are equal, round, and reactive to light. Cardiovascular:      Rate and Rhythm: Normal rate and regular rhythm. Pulses: Normal pulses. Heart sounds: Normal heart sounds. Pulmonary:      Effort: Pulmonary effort is normal.      Breath sounds: Normal breath sounds. No wheezing, rhonchi or rales. Chest:      Chest wall: No tenderness. Abdominal:      General: Bowel sounds are normal.      Palpations: Abdomen is soft. Tenderness: There is no abdominal tenderness. There is no guarding or rebound. Hernia: No hernia is present. Musculoskeletal:         General: Normal range of motion. Right lower leg: No edema. Left lower leg: No edema.    Lymphadenopathy:      Cervical: No cervical adenopathy. Skin:     General: Skin is warm and dry. Neurological:      Mental Status: He is alert and oriented to person, place, and time. Mental status is at baseline. 1. Chronic obstructive pulmonary disease, unspecified COPD type ([facility]    - REFERRAL TO PULMONARY DISEASE  - budesonide-formoteroL (SYMBICORT) 160-4.5 mcg/actuation HFAA; Take 2 Puffs by inhalation two (2) times a day. Dispense: 1 Inhaler; Refill: 5  - albuterol (PROVENTIL HFA, VENTOLIN HFA, PROAIR HFA) 90 mcg/actuation inhaler; Take 1 Puff by inhalation every four (4) hours as needed for Wheezing. Dispense: 1 Inhaler; Refill: 5  - XR CHEST PA LAT; Future    2. Habitual snoring    - SLEEP MEDICINE REFERRAL    3. Tobacco dependence      4. Gastroesophageal reflux disease without esophagitis      5. Anxiety      Chronic conditions have been stable and is only taking omeprazole daily as directed  Likely has underlying copd as he has been daily smoker for past 29 years  Going to start on daily symbicort as directed and reviewed side effects and rinsing mouth after each use. Also will send in albuterol inhaler to use prn for wheezing/sob  Check cxr  Refer to pulmonology for PFT's to confirm diagnosis   Also reporting symptoms of fatigue, habitual snoring and restless sleep  Going to order home sleep study to evaluate for sleep apnea and will plan follow up after test complete  Check labs to ensure no changes given complaint of fatigue    Patient verbalizes understanding of plan of care as discussed above    Follow-up and Dispositions    · Return in about 4 weeks (around 6/16/2021).

## 2021-05-29 LAB
ALBUMIN SERPL-MCNC: 4.4 G/DL (ref 4–5)
ALBUMIN/GLOB SERPL: 2 {RATIO} (ref 1.2–2.2)
ALP SERPL-CCNC: 97 IU/L (ref 48–121)
ALT SERPL-CCNC: 25 IU/L (ref 0–44)
APPEARANCE UR: CLEAR
AST SERPL-CCNC: 18 IU/L (ref 0–40)
BASOPHILS # BLD AUTO: 0.1 X10E3/UL (ref 0–0.2)
BASOPHILS NFR BLD AUTO: 1 %
BILIRUB SERPL-MCNC: 0.3 MG/DL (ref 0–1.2)
BILIRUB UR QL STRIP: NEGATIVE
BUN SERPL-MCNC: 14 MG/DL (ref 6–24)
BUN/CREAT SERPL: 16 (ref 9–20)
CALCIUM SERPL-MCNC: 9 MG/DL (ref 8.7–10.2)
CHLORIDE SERPL-SCNC: 100 MMOL/L (ref 96–106)
CHOLEST SERPL-MCNC: 265 MG/DL (ref 100–199)
CO2 SERPL-SCNC: 22 MMOL/L (ref 20–29)
COLOR UR: YELLOW
CREAT SERPL-MCNC: 0.85 MG/DL (ref 0.76–1.27)
EOSINOPHIL # BLD AUTO: 0.3 X10E3/UL (ref 0–0.4)
EOSINOPHIL NFR BLD AUTO: 3 %
ERYTHROCYTE [DISTWIDTH] IN BLOOD BY AUTOMATED COUNT: 12.4 % (ref 11.6–15.4)
GLOBULIN SER CALC-MCNC: 2.2 G/DL (ref 1.5–4.5)
GLUCOSE SERPL-MCNC: 80 MG/DL (ref 65–99)
GLUCOSE UR QL: NEGATIVE
HCT VFR BLD AUTO: 47 % (ref 37.5–51)
HDLC SERPL-MCNC: 34 MG/DL
HGB BLD-MCNC: 16.4 G/DL (ref 13–17.7)
HGB UR QL STRIP: NEGATIVE
IMM GRANULOCYTES # BLD AUTO: 0.1 X10E3/UL (ref 0–0.1)
IMM GRANULOCYTES NFR BLD AUTO: 1 %
KETONES UR QL STRIP: NEGATIVE
LDLC SERPL CALC-MCNC: 178 MG/DL (ref 0–99)
LEUKOCYTE ESTERASE UR QL STRIP: NEGATIVE
LYMPHOCYTES # BLD AUTO: 3.2 X10E3/UL (ref 0.7–3.1)
LYMPHOCYTES NFR BLD AUTO: 31 %
MCH RBC QN AUTO: 32.7 PG (ref 26.6–33)
MCHC RBC AUTO-ENTMCNC: 34.9 G/DL (ref 31.5–35.7)
MCV RBC AUTO: 94 FL (ref 79–97)
MICRO URNS: NORMAL
MONOCYTES # BLD AUTO: 0.7 X10E3/UL (ref 0.1–0.9)
MONOCYTES NFR BLD AUTO: 7 %
NEUTROPHILS # BLD AUTO: 5.8 X10E3/UL (ref 1.4–7)
NEUTROPHILS NFR BLD AUTO: 57 %
NITRITE UR QL STRIP: NEGATIVE
PH UR STRIP: 5.5 [PH] (ref 5–7.5)
PLATELET # BLD AUTO: 250 X10E3/UL (ref 150–450)
POTASSIUM SERPL-SCNC: 4.2 MMOL/L (ref 3.5–5.2)
PROT SERPL-MCNC: 6.6 G/DL (ref 6–8.5)
PROT UR QL STRIP: NEGATIVE
RBC # BLD AUTO: 5.02 X10E6/UL (ref 4.14–5.8)
SODIUM SERPL-SCNC: 138 MMOL/L (ref 134–144)
SP GR UR: 1.02 (ref 1–1.03)
TRIGL SERPL-MCNC: 274 MG/DL (ref 0–149)
TSH SERPL DL<=0.005 MIU/L-ACNC: 1.1 UIU/ML (ref 0.45–4.5)
UROBILINOGEN UR STRIP-MCNC: 0.2 MG/DL (ref 0.2–1)
VLDLC SERPL CALC-MCNC: 53 MG/DL (ref 5–40)
WBC # BLD AUTO: 10.1 X10E3/UL (ref 3.4–10.8)

## 2021-06-07 ENCOUNTER — TELEPHONE (OUTPATIENT)
Dept: PRIMARY CARE CLINIC | Age: 46
End: 2021-06-07

## 2021-06-08 RX ORDER — ATORVASTATIN CALCIUM 20 MG/1
20 TABLET, FILM COATED ORAL DAILY
Qty: 90 TABLET | Refills: 0 | Status: SHIPPED | OUTPATIENT
Start: 2021-06-08 | End: 2021-09-07

## 2021-06-08 NOTE — PROGRESS NOTES
Please let patient know that bad cholesterol is high at 178 with total at 265 and triglycerides 274. The 10-year ASCVD risk score (Immanuel Nina, et al., 2013) is: 10.8% and recommending that he start on statin medication to lower future cardiac risk. If he agrees, will send in and need to repeat the liver enzymes once at 6 weeks. Usually well tolerated but if develops joint/muscle pains that are different from baseline then needs to notify provider as could be side effect. Let me know if any questions about this.  Otherwise labs essentially normal.

## 2021-06-29 ENCOUNTER — OFFICE VISIT (OUTPATIENT)
Dept: PRIMARY CARE CLINIC | Age: 46
End: 2021-06-29
Payer: COMMERCIAL

## 2021-06-29 VITALS
BODY MASS INDEX: 29.71 KG/M2 | TEMPERATURE: 98.4 F | RESPIRATION RATE: 18 BRPM | HEART RATE: 81 BPM | OXYGEN SATURATION: 99 % | SYSTOLIC BLOOD PRESSURE: 131 MMHG | DIASTOLIC BLOOD PRESSURE: 70 MMHG | WEIGHT: 213 LBS

## 2021-06-29 DIAGNOSIS — R06.83 HABITUAL SNORING: ICD-10-CM

## 2021-06-29 DIAGNOSIS — F17.200 TOBACCO DEPENDENCE: ICD-10-CM

## 2021-06-29 DIAGNOSIS — J44.9 CHRONIC OBSTRUCTIVE PULMONARY DISEASE, UNSPECIFIED COPD TYPE (HCC): Primary | ICD-10-CM

## 2021-06-29 LAB
ALBUMIN SERPL-MCNC: 4.2 G/DL (ref 4–5)
ALBUMIN/GLOB SERPL: 1.5 {RATIO} (ref 1.2–2.2)
ALP SERPL-CCNC: 124 IU/L (ref 48–121)
ALT SERPL-CCNC: 27 IU/L (ref 0–44)
AST SERPL-CCNC: 15 IU/L (ref 0–40)
BILIRUB SERPL-MCNC: 0.5 MG/DL (ref 0–1.2)
BUN SERPL-MCNC: 14 MG/DL (ref 6–24)
BUN/CREAT SERPL: 14 (ref 9–20)
CALCIUM SERPL-MCNC: 9.4 MG/DL (ref 8.7–10.2)
CHLORIDE SERPL-SCNC: 103 MMOL/L (ref 96–106)
CO2 SERPL-SCNC: 24 MMOL/L (ref 20–29)
CREAT SERPL-MCNC: 1 MG/DL (ref 0.76–1.27)
GLOBULIN SER CALC-MCNC: 2.8 G/DL (ref 1.5–4.5)
GLUCOSE SERPL-MCNC: 104 MG/DL (ref 65–99)
POTASSIUM SERPL-SCNC: 4.3 MMOL/L (ref 3.5–5.2)
PROT SERPL-MCNC: 7 G/DL (ref 6–8.5)
SODIUM SERPL-SCNC: 142 MMOL/L (ref 134–144)

## 2021-06-29 PROCEDURE — 99214 OFFICE O/P EST MOD 30 MIN: CPT | Performed by: NURSE PRACTITIONER

## 2021-06-29 RX ORDER — BUPROPION HYDROCHLORIDE 150 MG/1
150 TABLET, EXTENDED RELEASE ORAL 2 TIMES DAILY
Qty: 60 TABLET | Refills: 2 | Status: SHIPPED | OUTPATIENT
Start: 2021-06-29 | End: 2021-09-26

## 2021-06-29 NOTE — PROGRESS NOTES
Nancy Husbands is a 42 y.o. male who presents to the office today for the following:    Chief Complaint   Patient presents with    Follow-up    COPD       Past Medical History:   Diagnosis Date    Anxiety     Anxiety 11/11/2020    Anxiety     Chronic obstructive pulmonary disease ([address] 6/29/2021    Depression     Depression 11/11/2020    Gastrointestinal disorder     GERD (gastroesophageal reflux disease)     GERD (gastroesophageal reflux disease) 11/11/2020    Tobacco dependence 6/29/2021       Past Surgical History:   Procedure Laterality Date    COLONOSCOPY N/A 11/20/2020    COLONOS[facility] by Dulce Maria Panda [facility] at [facility] ENDOSCOPY    HX APPENDECTOMY          Family History   Problem Relation Age of Onset    No Known Problems Mother     Lung Disease Father     Cancer Maternal Grandmother     Diabetes Maternal Grandmother     Heart Disease Maternal Grandmother         Social History     Tobacco Use    Smoking status: Heavy Tobacco Smoker     Packs/day: 1.00     Years: 29.00     Pack years: 29.00     Types: Cigarettes    Smokeless tobacco: Never Used    Tobacco comment: States I hae to get my head right. .. Vaping Use    Vaping Use: Never used   Substance Use Topics    Alcohol use: Yes     Alcohol/week: 1.0 standard drinks     Types: 1 Glasses of wine per week     Comment: 1 tiime / week    Drug use: Never        HPI  Patient here today for 1 month follow up for copd, hyperlipidemia, GERD and habitual snoring. Since last visit, he has had appointment with pulmonologist (Dr. Odette Funez) and confirmed diagnosis of copd. Was told to continue the symbicort and albuterol prn. Also given steroid taper and antibiotic. Reports he just finished steroids and thinks this was making him feel \"nervous\" and also \"irritable. \" Also has been working on quitting smoking and went from 2ppd to 1ppd.  Has been set up for home sleep study but reports the equipment that was sent originally did not work and they are re-sending today. Current Outpatient Medications on File Prior to Visit   Medication Sig    atorvastatin (LIPITOR) 20 mg tablet Take 1 Tablet by mouth daily.  budesonide-formoteroL (SYMBICORT) 160-4.5 mcg/actuation HFAA Take 2 Puffs by inhalation two (2) times a day.  albuterol (PROVENTIL HFA, VENTOLIN HFA, PROAIR HFA) 90 mcg/actuation inhaler Take 1 Puff by inhalation every four (4) hours as needed for Wheezing.  omeprazole (PRILOSEC) 20 mg capsule Take 1 Cap by mouth daily. No current facility-administered medications on file prior to visit. Medications Ordered Today   Medications    buPROPion SR (WELLBUTRIN SR) 150 mg SR tablet     Sig: Take 1 Tablet by mouth two (2) times a day. Dispense:  60 Tablet     Refill:  2        Review of Systems   Constitutional: Negative. Respiratory: Positive for cough, sputum production, shortness of breath and wheezing. Negative for hemoptysis. Cardiovascular: Negative. Gastrointestinal: Negative. Genitourinary: Negative. Musculoskeletal: Negative. Neurological: Negative. Psychiatric/Behavioral: Negative for depression, hallucinations, substance abuse and suicidal ideas. The patient is nervous/anxious. The patient does not have insomnia. Visit Vitals  /70 (BP 1 Location: Left upper arm, BP Patient Position: Sitting, BP Cuff Size: Adult)   Pulse 81   Temp 98.4 °F (36.9 °C) (Oral)   Resp 18   Wt 213 lb (96.6 kg)   SpO2 99%   BMI 29.71 kg/m²       Physical Exam  Vitals and nursing note reviewed. Constitutional:       Appearance: Normal appearance. Eyes:      Pupils: Pupils are equal, round, and reactive to light. Cardiovascular:      Rate and Rhythm: Normal rate and regular rhythm. Pulses: Normal pulses. Heart sounds: Normal heart sounds. Pulmonary:      Effort: Pulmonary effort is normal.      Breath sounds: Wheezing present.    Abdominal:      General: Bowel sounds are normal. Palpations: Abdomen is soft. Tenderness: There is no abdominal tenderness. There is no guarding or rebound. Hernia: No hernia is present. Musculoskeletal:         General: Normal range of motion. Right lower leg: No edema. Left lower leg: No edema. Skin:     General: Skin is warm and dry. Neurological:      Mental Status: He is alert. Mental status is at baseline. Psychiatric:         Mood and Affect: Mood normal.         Behavior: Behavior normal.            1. Chronic obstructive pulmonary disease, unspecified COPD type [facility])  Patient referred at last visit to pulmonology and has had appointment with Dr. Anila Oneil recent steroid taper and antibiotics  Still has some mild wheezing today but no sob/chest pain  He is going to continue symbicort as directed  Also has albuterol inhaler to use prn for wheezing/sob  Advised to notify provider for increased wheezing/sob  Has appointment to see Dr. Elmer Ordonez again 7/13/21 for follow up    2. Habitual snoring  Referred for sleep study eval done last visit and re-sending equipment to complete  Follow up pending results from sleep study    3. Tobacco dependence  He is continuing to work on reducing smoking and down from 2ppd to 1ppd which is great progress. Is experiencing some irritability from this and we discussed trying wellbutrin since helped him in the past.  Will start on wellbutrin as directed. Reviewed tapering and potential side effects. Patient verbalizes understanding of plan of care as discussed above    Follow-up and Dispositions    · Return in about 6 weeks (around 8/10/2021) for or sooner for worsening symptoms.

## 2021-07-15 ENCOUNTER — TELEPHONE (OUTPATIENT)
Dept: PRIMARY CARE CLINIC | Age: 46
End: 2021-07-15

## 2021-07-15 NOTE — TELEPHONE ENCOUNTER
denise quezada and pt does not qualify for a cpap machine his sleep study AHI is only 3.5  And has to be 5 or greater.

## 2021-08-11 ENCOUNTER — OFFICE VISIT (OUTPATIENT)
Dept: PRIMARY CARE CLINIC | Age: 46
End: 2021-08-11
Payer: COMMERCIAL

## 2021-08-11 VITALS
OXYGEN SATURATION: 98 % | DIASTOLIC BLOOD PRESSURE: 58 MMHG | RESPIRATION RATE: 18 BRPM | HEART RATE: 84 BPM | BODY MASS INDEX: 29.82 KG/M2 | TEMPERATURE: 97.5 F | SYSTOLIC BLOOD PRESSURE: 106 MMHG | WEIGHT: 213.8 LBS

## 2021-08-11 DIAGNOSIS — F41.9 ANXIETY: ICD-10-CM

## 2021-08-11 DIAGNOSIS — F17.200 TOBACCO DEPENDENCE: ICD-10-CM

## 2021-08-11 DIAGNOSIS — G47.33 OBSTRUCTIVE SLEEP APNEA: Primary | ICD-10-CM

## 2021-08-11 DIAGNOSIS — J44.9 CHRONIC OBSTRUCTIVE PULMONARY DISEASE, UNSPECIFIED COPD TYPE (HCC): ICD-10-CM

## 2021-08-11 DIAGNOSIS — R53.83 FATIGUE, UNSPECIFIED TYPE: ICD-10-CM

## 2021-08-11 PROCEDURE — 99214 OFFICE O/P EST MOD 30 MIN: CPT | Performed by: NURSE PRACTITIONER

## 2021-08-12 NOTE — PROGRESS NOTES
Dirk Gage is a 42 y.o. male who presents to the office today for the following:    Chief Complaint   Patient presents with    COPD    Sleep Problem       Past Medical History:   Diagnosis Date    Anxiety     Anxiety 11/11/2020    Anxiety     Chronic obstructive pulmonary disease ([address] 6/29/2021    Depression     Depression 11/11/2020    Gastrointestinal disorder     GERD (gastroesophageal reflux disease)     GERD (gastroesophageal reflux disease) 11/11/2020    Tobacco dependence 6/29/2021       Past Surgical History:   Procedure Laterality Date    COLONOSCOPY N/A 11/20/2020    COLONOSCOPY performed by Rena Xiao MD at [facility] END[facility]    HX APPENDECTOMY          Family History   Problem Relation Age of Onset    No Known Problems Mother     Lung Disease Father     Cancer Maternal Grandmother     Diabetes Maternal Grandmother     Heart Disease Maternal Grandmother         Social History     Tobacco Use    Smoking status: Heavy Tobacco Smoker     Packs/day: 1.00     Years: 29.00     Pack years: 29.00     Types: Cigarettes    Smokeless tobacco: Never Used    Tobacco comment: States I hae to get my head right. .. Vaping Use    Vaping Use: Never used   Substance Use Topics    Alcohol use: Yes     Alcohol/week: 1.0 standard drinks     Types: 1 Glasses of wine per week     Comment: 1 tiime / week    Drug use: Never      HPI  Patient here today for  follow up of sleep study, copd and tobacco dependence with PMH of  copd, hyperlipidemia,tobacco dependence,  GERD and habitual snoring. Since last visit, he has has completed sleep study and here today for results. States that he is tolerating the wellbutrin that was started last visit and feels this is helping with reducing smoking. Is now down to 0.5ppd from 2ppd. Breathing has been improved and not requiring daily use of albuterol anymore.      Current Outpatient Medications on File Prior to Visit   Medication Sig    buPROPion SR [facility] SR) 150 mg SR tablet Take 1 Tablet by mouth two (2) times a day.  atorvastatin (LIPITOR) 20 mg tablet Take 1 Tablet by mouth daily.  budesonide-formoteroL (SYMBICORT) 160-4.5 mcg/actuation HFAA Take 2 Puffs by inhalation two (2) times a day.  albuterol (PROVENTIL HFA, VENTOLIN HFA, PROAIR HFA) 90 mcg/actuation inhaler Take 1 Puff by inhalation every four (4) hours as needed for Wheezing.  omeprazole (PRILOSEC) 20 mg capsule Take 1 Cap by mouth daily. No current facility-administered medications on file prior to visit. No orders of the defined types were placed in this encounter. Review of Systems   Constitutional: Positive for malaise/fatigue. Negative for chills, fever and weight loss. Respiratory: Positive for cough (minimal) and wheezing. Negative for hemoptysis, sputum production and shortness of breath. Cardiovascular: Negative. Gastrointestinal: Negative. Genitourinary: Negative. Musculoskeletal: Negative. Neurological: Negative. Psychiatric/Behavioral: Negative for depression, hallucinations, substance abuse and suicidal ideas. The patient is not nervous/anxious and does not have insomnia. Visit Vitals  BP (!) 106/58 (BP 1 Location: Left upper arm, BP Patient Position: Sitting, BP Cuff Size: Adult)   Pulse 84   Temp 97.5 °F (36.4 °C) (Temporal)   Resp 18   Wt 213 lb 12.8 oz (97 kg)   SpO2 98%   BMI 29.82 kg/m²       Physical Exam  Vitals and nursing note reviewed. Constitutional:       Appearance: Normal appearance. Eyes:      Pupils: Pupils are equal, round, and reactive to light. Cardiovascular:      Rate and Rhythm: Normal rate and regular rhythm. Pulses: Normal pulses. Heart sounds: Normal heart sounds. Pulmonary:      Effort: Pulmonary effort is normal. No respiratory distress. Breath sounds: No stridor. No wheezing or rhonchi. Abdominal:      General: Bowel sounds are normal.      Palpations: Abdomen is soft. Tenderness: There is no abdominal tenderness. Musculoskeletal:         General: Normal range of motion. Right lower leg: No edema. Left lower leg: No edema. Skin:     General: Skin is warm and dry. Neurological:      Mental Status: He is alert. Mental status is at baseline. Psychiatric:         Mood and Affect: Mood normal.         Behavior: Behavior normal.            1. Chronic obstructive pulmonary disease, unspecified COPD type ([address]  Patient breathing improved since starting symbicort and reducing smoking  He has seen Dr. Sebas Valladares (pulmonology) and has follow up appointment scheduled    2. Obstructive Sleep Apnea  Reviewed sleep study report from 7/4-7/5/21 and shows mild obstructive sleep apnea  We discussed treatments including cpap therapy, sleep hygiene, weight reduction, avoiding alcohol before bed and avoiding sleeping supine. He would like to try cpap and order for autopap and supplies sent    3. Tobacco dependence  He is continuing to work on reducing smoking and down from 2ppd to 0.5ppd  Tolerating wellbutrin and will continue as directed    Patient verbalizes understanding of plan of care as discussed above    Follow-up and Dispositions    · Return in about 6 weeks (around 9/22/2021).

## 2021-09-07 RX ORDER — OMEPRAZOLE 20 MG/1
CAPSULE, DELAYED RELEASE ORAL
Qty: 90 CAPSULE | Refills: 1 | Status: SHIPPED | OUTPATIENT
Start: 2021-09-07 | End: 2021-12-01 | Stop reason: SDUPTHER

## 2021-09-07 RX ORDER — ATORVASTATIN CALCIUM 20 MG/1
TABLET, FILM COATED ORAL
Qty: 90 TABLET | Refills: 0 | Status: SHIPPED | OUTPATIENT
Start: 2021-09-07 | End: 2021-11-29 | Stop reason: SDUPTHER

## 2021-09-24 ENCOUNTER — TELEPHONE (OUTPATIENT)
Dept: PRIMARY CARE CLINIC | Age: 46
End: 2021-09-24

## 2021-09-24 NOTE — TELEPHONE ENCOUNTER
Patient called regarding his sleep apnea machine from [facility].  He said he has tried them numerous times but nobody gets in touch with him. He did not know if we could call them. Patient would like a call back.

## 2021-09-26 RX ORDER — BUPROPION HYDROCHLORIDE 150 MG/1
150 TABLET, EXTENDED RELEASE ORAL 2 TIMES DAILY
Qty: 60 TABLET | Refills: 2 | Status: SHIPPED | OUTPATIENT
Start: 2021-09-26 | End: 2022-01-03

## 2021-10-13 ENCOUNTER — TELEPHONE (OUTPATIENT)
Dept: PRIMARY CARE CLINIC | Age: 46
End: 2021-10-13

## 2021-10-13 NOTE — TELEPHONE ENCOUNTER
Can you call the pt and schedule an appt for Cpap Compliance, he has to be seen between 11/12/21 thru 01/10/2022.  Thank you

## 2021-10-15 DIAGNOSIS — J44.9 CHRONIC OBSTRUCTIVE PULMONARY DISEASE, UNSPECIFIED COPD TYPE (HCC): ICD-10-CM

## 2021-10-15 RX ORDER — BUDESONIDE AND FORMOTEROL FUMARATE DIHYDRATE 160; 4.5 UG/1; UG/1
AEROSOL RESPIRATORY (INHALATION)
Qty: 30.6 EACH | Refills: 1 | Status: SHIPPED | OUTPATIENT
Start: 2021-10-15

## 2021-11-29 DIAGNOSIS — E78.2 MIXED HYPERLIPIDEMIA: Primary | ICD-10-CM

## 2021-11-29 RX ORDER — ATORVASTATIN CALCIUM 20 MG/1
20 TABLET, FILM COATED ORAL DAILY
Qty: 90 TABLET | Refills: 0 | Status: SHIPPED | OUTPATIENT
Start: 2021-11-29 | End: 2022-03-11

## 2021-12-01 ENCOUNTER — OFFICE VISIT (OUTPATIENT)
Dept: PRIMARY CARE CLINIC | Age: 46
End: 2021-12-01
Payer: COMMERCIAL

## 2021-12-01 VITALS
DIASTOLIC BLOOD PRESSURE: 70 MMHG | SYSTOLIC BLOOD PRESSURE: 108 MMHG | RESPIRATION RATE: 18 BRPM | TEMPERATURE: 97.7 F | WEIGHT: 219 LBS | BODY MASS INDEX: 30.54 KG/M2 | HEART RATE: 84 BPM | OXYGEN SATURATION: 98 %

## 2021-12-01 DIAGNOSIS — E78.2 MIXED HYPERLIPIDEMIA: ICD-10-CM

## 2021-12-01 DIAGNOSIS — F32.A DEPRESSION, UNSPECIFIED DEPRESSION TYPE: ICD-10-CM

## 2021-12-01 DIAGNOSIS — F17.200 TOBACCO DEPENDENCE: ICD-10-CM

## 2021-12-01 DIAGNOSIS — J44.9 CHRONIC OBSTRUCTIVE PULMONARY DISEASE, UNSPECIFIED COPD TYPE (HCC): ICD-10-CM

## 2021-12-01 DIAGNOSIS — K21.9 GASTROESOPHAGEAL REFLUX DISEASE WITHOUT ESOPHAGITIS: ICD-10-CM

## 2021-12-01 DIAGNOSIS — R45.0 JITTERY FEELING: ICD-10-CM

## 2021-12-01 DIAGNOSIS — G47.33 OBSTRUCTIVE SLEEP APNEA: Primary | ICD-10-CM

## 2021-12-01 PROCEDURE — 99214 OFFICE O/P EST MOD 30 MIN: CPT | Performed by: NURSE PRACTITIONER

## 2021-12-01 RX ORDER — OMEPRAZOLE 40 MG/1
CAPSULE, DELAYED RELEASE ORAL
Qty: 90 CAPSULE | Refills: 1 | Status: SHIPPED | OUTPATIENT
Start: 2021-12-01

## 2021-12-01 NOTE — PROGRESS NOTES
Susan Jain is a 42 y.o. male who presents to the office today for the following:    Chief Complaint   Patient presents with    Anxiety    GERD    Shaking       Past Medical History:   Diagnosis Date    Anxiety     Anxiety 11/11/2020    Anxiety     Chronic obstructive pulmonary disease ([address] 6/29/2021    Depression     Depression 11/11/2020    Gastrointestinal disorder     GERD (gastroesophageal reflux disease)     GERD (gastroesophageal reflux disease) 11/11/2020    Tobacco dependence 6/29/2021       Past Surgical History:   Procedure Laterality Date    COLONOSCOPY N/A 11/20/2020    COLONOSCOPY performed by Sg Choi MD at [facility] ENDOSCOPY    HX APPENDECTOMY          Family History   Problem Relation Age of Onset    No Known Problems Mother     Lung Disease Father     Cancer Maternal Grandmother     Diabetes Maternal Grandmother     Heart Disease Maternal Grandmother         Social History     Tobacco Use    Smoking status: Heavy Tobacco Smoker     Packs/day: 1.00     Years: 29.00     Pack years: 29.00     Types: Cigarettes    Smokeless tobacco: Never Used    Tobacco comment: States I hae to get my head right. .. Vaping Use    Vaping Use: Never used   Substance Use Topics    Alcohol use: Yes     Alcohol/week: 1.0 standard drink     Types: 1 Glasses of wine per week     Comment: 1 tiime / week    Drug use: Never      HPI  Patient here today for  follow up with PMH of  copd, hyperlipidemia,sleep panea, tobacco dependence and GERD  Since last visit,states that he is doing well with the cpap but feels that he is burping a lot of \"air\" in the mornings and some abdominal bloating. Has also had worsening reflux symptoms since starting cpap use and feels acid coming up in am particularly. Is taking his prilosec 20mg daily. Denies any epigastric pain, vomiting , diarrhea or blood in stool. Appetite ok. Also feels that he is more \"jittery\" or \"shaky. \" Notices when he is working on equipment at work. Is not sure if it may be medication related as started around the time he began wellbutrin. Current Outpatient Medications on File Prior to Visit   Medication Sig    atorvastatin (LIPITOR) 20 mg tablet Take 1 Tablet by mouth daily.  Symbicort 160-4.5 mcg/actuation HFAA INHALE 2 PUFFS BY MOUTH TWICE A DAY    buPROPion SR (WELLBUTRIN SR) 150 mg SR tablet TAKE 1 TABLET BY MOUTH TWO (2) TIMES A DAY.  albuterol (PROVENTIL HFA, VENTOLIN HFA, PROAIR HFA) 90 mcg/actuation inhaler Take 1 Puff by inhalation every four (4) hours as needed for Wheezing. No current facility-administered medications on file prior to visit. Medications Ordered Today   Medications    omeprazole (PRILOSEC) 40 mg capsule     Sig: TAKE 1 CAPSULE BY MOUTH EVERY DAY     Dispense:  90 Capsule     Refill:  1        Review of Systems   Constitutional: Negative for chills, fever, malaise/fatigue and weight loss. Respiratory: Positive for cough (minimal) and wheezing. Negative for hemoptysis, sputum production and shortness of breath. Cardiovascular: Negative. Gastrointestinal: Positive for heartburn and nausea. Negative for constipation, diarrhea and vomiting. Genitourinary: Negative. Musculoskeletal: Negative. Neurological: Negative. Psychiatric/Behavioral: Negative for depression, hallucinations, substance abuse and suicidal ideas. The patient is not nervous/anxious and does not have insomnia. Visit Vitals  /70 (BP 1 Location: Left upper arm, BP Patient Position: Sitting, BP Cuff Size: Adult)   Pulse 84   Temp 97.7 °F (36.5 °C) (Temporal)   Resp 18   Wt 219 lb (99.3 kg)   SpO2 98%   BMI 30.54 kg/m²       Physical Exam  Vitals and nursing note reviewed. Constitutional:       Appearance: Normal appearance. Eyes:      Pupils: Pupils are equal, round, and reactive to light. Cardiovascular:      Rate and Rhythm: Normal rate and regular rhythm. Pulses: Normal pulses. Heart sounds: Normal heart sounds. Pulmonary:      Effort: Pulmonary effort is normal. No respiratory distress. Breath sounds: No stridor. No wheezing or rhonchi. Abdominal:      General: Bowel sounds are normal.      Palpations: Abdomen is soft. Tenderness: There is no abdominal tenderness. Musculoskeletal:         General: Normal range of motion. Right lower leg: No edema. Left lower leg: No edema. Skin:     General: Skin is warm and dry. Neurological:      Mental Status: He is alert. Mental status is at baseline. Psychiatric:         Mood and Affect: Mood normal.         Behavior: Behavior normal.            1. Chronic obstructive pulmonary disease, unspecified COPD type ([facility]  Patient breathing improved since starting symbicort and reducing smoking  He has seen Dr. Skip Castleman (pulmonology) and has follow up appointment scheduled    2. Obstructive Sleep Apnea  He is using cpap and feels that the daily fatigue is improved  Having some difficulty with his mask and he is reaching out to medical sleep [employer] to see if this will help    3. Tobacco dependence  He is continuing to work on reducing smoking and down from 2ppd to 0.5ppd  Has helped being on wellbutrin    4. Depression, unspecified depression type  This is improved but going to decrease his Wellbutrin to 150mg daily as he may be having some side effects    5. Gastroesophageal reflux disease without esophagitis  Recent increased reflux symptoms particularly worse first thing in am  Could be related to cpap and feels that he is burping \"air\" a lot since using the cpap   Going to increase the prilosec to 40mg daily but if not improving, want him to be evaluate by GI  - omeprazole (PRILOSEC) 40 mg capsule; TAKE 1 CAPSULE BY MOUTH EVERY DAY  Dispense: 90 Capsule; Refill: 1    6.  Mixed hyperlipidemia  Lab Results   Component Value Date/Time    LDL, calculated 178 (H) 05/28/2021 02:46 PM   Started on atorvastatin 20mg daily  Repeat lipid panel    7. Jittery feeling  Think this could be related to the wellbutrin as started around similar time  Going to reduce to 150mg daily and see if this helps  Also states that he is going to monitor to see if may also occur if using the albuterol      Patient verbalizes understanding of plan of care as discussed above    Follow-up and Dispositions    · Return in about 4 weeks (around 12/29/2021) for or sooner for worsening symptoms.

## 2022-01-03 RX ORDER — BUPROPION HYDROCHLORIDE 150 MG/1
TABLET, EXTENDED RELEASE ORAL
Qty: 60 TABLET | Refills: 2 | Status: SHIPPED | OUTPATIENT
Start: 2022-01-03

## 2022-03-11 DIAGNOSIS — E78.2 MIXED HYPERLIPIDEMIA: ICD-10-CM

## 2022-03-11 RX ORDER — ATORVASTATIN CALCIUM 20 MG/1
TABLET, FILM COATED ORAL
Qty: 90 TABLET | Refills: 0 | Status: SHIPPED | OUTPATIENT
Start: 2022-03-11

## 2022-03-18 PROBLEM — F32.A DEPRESSION: Status: ACTIVE | Noted: 2020-11-11

## 2022-03-19 PROBLEM — J44.9 CHRONIC OBSTRUCTIVE PULMONARY DISEASE (HCC): Status: ACTIVE | Noted: 2021-06-29

## 2022-03-19 PROBLEM — R53.83 FATIGUE: Status: ACTIVE | Noted: 2021-08-11

## 2022-03-19 PROBLEM — K21.9 GERD (GASTROESOPHAGEAL REFLUX DISEASE): Status: ACTIVE | Noted: 2020-11-11

## 2022-03-19 PROBLEM — G47.33 OBSTRUCTIVE SLEEP APNEA: Status: ACTIVE | Noted: 2021-08-11

## 2022-03-19 PROBLEM — F17.200 TOBACCO DEPENDENCE: Status: ACTIVE | Noted: 2021-06-29

## 2022-03-20 PROBLEM — F41.9 ANXIETY: Status: ACTIVE | Noted: 2020-11-11

## 2023-03-30 NOTE — PROGRESS NOTES
David Andres is a 39 y.o. male who presents to the office today for the following:    Chief Complaint   Patient presents with    Constipation    Abdominal Pain    Bloated    Urinary Retention       Past Medical History:   Diagnosis Date    Anxiety     Depression     GERD (gastroesophageal reflux disease)     Psychotic disorder ([facility]        Past Surgical History:   Procedure Laterality Date    HX APPENDECTOMY          Family History   Problem Relation Age of Onset    No Known Problems Mother     No Known Problems Father         Social History     Tobacco Use    Smoking status: Current Every Day Smoker    Smokeless tobacco: Never Used   Substance Use Topics    Alcohol use: Not on file    Drug use: Not on file        HPI  Patient here with c/o mild lower abdominal pain and constipation. States that for the past 6 months or more has been having frequent constipation and bloating. Reports that they are often hard when he does go. Does not take any medication to help with constipation at present. Reports for past couple of days he has noticed changes in his stool and has been getting cramping prior to going to bathroom. Is only getting out a little stool which gives some relief but does not feel like he went completely. Has had a streak of blood one time in his stool but no persistent or obvious blood. He also reports that for past year he has had issue also with not feeling like bladder empties completely and seems to go more than normal. Denies any pain or blood in urine. Current Outpatient Medications on File Prior to Visit   Medication Sig    omeprazole (PRILOSEC) 20 mg capsule TAKE 1 CAPSULE BY MOUTH EVERY DAY     No current facility-administered medications on file prior to visit. Medications Ordered Today   Medications    polyethylene glycol (MIRALAX) 17 gram packet     Sig: Take 1 Packet by mouth daily.      Dispense:  30 Each     Refill:  1        Review of Systems Constitutional: Negative. Respiratory: Negative. Cardiovascular: Negative. Gastrointestinal: Positive for abdominal pain and constipation. Negative for diarrhea, heartburn, melena, nausea and vomiting. Genitourinary: Positive for frequency. Negative for dysuria, hematuria and urgency. Musculoskeletal: Negative. Neurological: Negative. Visit Vitals  /73 (BP 1 Location: Left arm, BP Patient Position: Sitting)   Pulse 75   Temp 97.7 °F (36.5 °C) (Tympanic)   Resp 18   Ht 5' 7\" (1.702 m)   Wt 218 lb (98.9 kg)   SpO2 97%   BMI 34.14 kg/m²       Physical Exam  Vitals signs and nursing note reviewed. Constitutional:       Appearance: Normal appearance. He is obese. Cardiovascular:      Rate and Rhythm: Normal rate and regular rhythm. Pulses: Normal pulses. Heart sounds: Normal heart sounds. Pulmonary:      Effort: Pulmonary effort is normal.      Breath sounds: Normal breath sounds. Abdominal:      General: Bowel sounds are normal. There is distension (mild). Palpations: Abdomen is soft. There is no mass. Tenderness: There is abdominal tenderness (mild lower quadrants). There is no right CVA tenderness, left CVA tenderness, guarding or rebound. Hernia: No hernia is present. Musculoskeletal: Normal range of motion. Skin:     General: Skin is warm and dry. Neurological:      Mental Status: He is alert. Mental status is at baseline.           1. Constipation, unspecified constipation type  Minimal tenderness on exam and currently feels acutely constipated  Will check KUB  Recommend mag citrate OTC (drink half and if no BM in 2 hours then drink other half) for acute constipation  Start on daily miralax as directed  Encouraged increased water, fiber (supplement/food) and exercise to help prevent constipation  Will discuss further recommendations and evaluate if further testing needed once we get x-ray results and treat for acute constipation  Advised if he develops increasing abdominal pain, n/v or other worsening symptoms go immediately to ED  - XR ABD (KUB); Future  - polyethylene glycol (MIRALAX) 17 gram packet; Take 1 Packet by mouth daily. Dispense: 30 Each; Refill: 1    2. Incomplete bladder emptying  Urine is clear in office and will check PSA  Likely start on flomax and possibly refer to urology for further eval   - AMB POC URINALYSIS DIP STICK AUTO W/O MICRO  - PSA W/ REFLX FREE PSA      Patient verbalizes understanding of plan of care as discussed above    Follow-up and Dispositions    · Return if symptoms worsen or fail to improve. Bactrim Pregnancy And Lactation Text: This medication is Pregnancy Category D and is known to cause fetal risk.  It is also excreted in breast milk.

## 2023-05-22 RX ORDER — OMEPRAZOLE 40 MG/1
1 CAPSULE, DELAYED RELEASE ORAL DAILY
COMMUNITY
Start: 2021-12-01

## 2023-05-22 RX ORDER — ALBUTEROL SULFATE 90 UG/1
1 AEROSOL, METERED RESPIRATORY (INHALATION) EVERY 4 HOURS PRN
COMMUNITY
Start: 2021-05-19

## 2023-05-22 RX ORDER — ATORVASTATIN CALCIUM 20 MG/1
1 TABLET, FILM COATED ORAL DAILY
COMMUNITY
Start: 2022-03-11

## 2023-05-22 RX ORDER — BUDESONIDE AND FORMOTEROL FUMARATE DIHYDRATE 160; 4.5 UG/1; UG/1
2 AEROSOL RESPIRATORY (INHALATION) 2 TIMES DAILY
COMMUNITY
Start: 2021-10-15

## 2023-05-22 RX ORDER — BUPROPION HYDROCHLORIDE 150 MG/1
TABLET, EXTENDED RELEASE ORAL
COMMUNITY
Start: 2022-01-03

## (undated) DEVICE — STERILE POLYISOPRENE POWDER-FREE SURGICAL GLOVES: Brand: PROTEXIS

## (undated) DEVICE — TUBING, SUCTION, 9/32" X 10', STRAIGHT: Brand: MEDLINE

## (undated) DEVICE — SPONGE GZ W4XL4IN COT 12 PLY TYP VII WVN C FLD DSGN

## (undated) DEVICE — SOL IRR STRL H2O 1000ML BTL --

## (undated) DEVICE — POLYP TRAP: Brand: TRAPEASE®

## (undated) DEVICE — SINGLE-USE POLYPECTOMY SNARE: Brand: CAPTIFLEX

## (undated) DEVICE — FCPS RAD JAW 4LC 240CM W/NDL -- BX/20 RADIAL JAW 4

## (undated) DEVICE — PAD,PREPPING,CUFFED,24X48,7",NONSTERILE: Brand: MEDLINE

## (undated) DEVICE — 1200CC GUARDIAN II: Brand: GUARDIAN

## (undated) DEVICE — JELLY,LUBE,STERILE,FLIP TOP,TUBE,4-OZ: Brand: MEDLINE

## (undated) DEVICE — SUREFIT, DUAL DISPERSIVE ELECTRODE, CONTACT QUALITY MONITOR: Brand: SUREFIT

## (undated) DEVICE — WASH CLOTH INCONT LO LINT PREM 12X13 IN LF DISP